# Patient Record
Sex: MALE | Race: WHITE | NOT HISPANIC OR LATINO | Employment: OTHER | URBAN - METROPOLITAN AREA
[De-identification: names, ages, dates, MRNs, and addresses within clinical notes are randomized per-mention and may not be internally consistent; named-entity substitution may affect disease eponyms.]

---

## 2022-03-17 ENCOUNTER — ESTABLISHED PATIENT (OUTPATIENT)
Dept: URBAN - METROPOLITAN AREA CLINIC 19 | Facility: CLINIC | Age: 87
End: 2022-03-17

## 2022-03-17 ENCOUNTER — PREPPED CHART (OUTPATIENT)
Dept: URBAN - METROPOLITAN AREA CLINIC 19 | Facility: CLINIC | Age: 87
End: 2022-03-17

## 2022-03-17 DIAGNOSIS — H52.4: ICD-10-CM

## 2022-03-17 PROCEDURE — 92015 DETERMINE REFRACTIVE STATE: CPT

## 2022-03-17 ASSESSMENT — VISUAL ACUITY
OS_PH: 20/40
OU_CC: 20/30
OD_SC: CF 2FT
OS_SC: 20/40-2

## 2022-03-17 ASSESSMENT — KERATOMETRY
OS_K1POWER_DIOPTERS: 42.50
OD_K2POWER_DIOPTERS: 43.75
OS_AXISANGLE2_DEGREES: 116
OD_K1POWER_DIOPTERS: 41.25
OD_AXISANGLE_DEGREES: 164
OD_AXISANGLE2_DEGREES: 74
OS_K2POWER_DIOPTERS: 44.00
OS_AXISANGLE_DEGREES: 026

## 2022-03-17 ASSESSMENT — TONOMETRY
OD_IOP_MMHG: 14
OS_IOP_MMHG: 11

## 2022-04-08 ENCOUNTER — FOLLOW UP (OUTPATIENT)
Dept: URBAN - METROPOLITAN AREA CLINIC 19 | Facility: CLINIC | Age: 87
End: 2022-04-08

## 2022-04-08 DIAGNOSIS — H35.3232: ICD-10-CM

## 2022-04-08 DIAGNOSIS — H35.30: ICD-10-CM

## 2022-04-08 PROCEDURE — 92014 COMPRE OPH EXAM EST PT 1/>: CPT

## 2022-04-08 PROCEDURE — 92134 CPTRZ OPH DX IMG PST SGM RTA: CPT

## 2022-04-08 ASSESSMENT — VISUAL ACUITY
OS_SC: 20/50-1
OU_SC: 20/70-1

## 2022-04-08 ASSESSMENT — TONOMETRY
OS_IOP_MMHG: 9
OD_IOP_MMHG: 10

## 2022-04-08 NOTE — PATIENT DISCUSSION
CATARACT PROGRESSING OD BUT GIVEN SEVERITY OF MACULAR DEGENERATION OD, WOULD NOT EXPECT SIGNIFICANT BENEFIT OF VISION WITH CATARACT SURGERY AT THIS TIME AND PATIENT DOING WELL AND ABLE TO DO ALL ADL'S. THEREFORE RECOMMEND OBSERVATION AT THIS TIME.